# Patient Record
Sex: FEMALE | Race: OTHER | Employment: STUDENT | URBAN - NONMETROPOLITAN AREA
[De-identification: names, ages, dates, MRNs, and addresses within clinical notes are randomized per-mention and may not be internally consistent; named-entity substitution may affect disease eponyms.]

---
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
## 2022-01-20 ENCOUNTER — OFFICE VISIT (OUTPATIENT)
Dept: ORTHOPEDIC SURGERY | Age: 22
End: 2022-01-20
Payer: COMMERCIAL

## 2022-01-20 VITALS
HEART RATE: 75 BPM | DIASTOLIC BLOOD PRESSURE: 70 MMHG | TEMPERATURE: 97.2 F | OXYGEN SATURATION: 98 % | SYSTOLIC BLOOD PRESSURE: 109 MMHG

## 2022-01-20 DIAGNOSIS — Z86.16 HISTORY OF COVID-19: Primary | ICD-10-CM

## 2022-01-20 PROCEDURE — 99213 OFFICE O/P EST LOW 20 MIN: CPT | Performed by: STUDENT IN AN ORGANIZED HEALTH CARE EDUCATION/TRAINING PROGRAM

## 2022-01-20 NOTE — PROGRESS NOTES
CC:   Chief Complaint   Patient presents with    Follow-up     Athletic clearance after having COVID. No lingering symptoms. HPI  Yudelka Cedillo is a 25 y.o. female here to be evaluated after recent COVID infection. Onset of symptoms: 1/7/22  Tested positive: 1/7/22  Symptoms: headache, runny nose, mild cough, no fever  Current/lingering symptoms: No remaining symptoms, no cough, no fever    Has exercised, felt normal - just a little out of shape. No excessive SOB or chest pain. Review of Systems  As above. Vitals:    01/20/22 0913   BP: 109/70   Site: Right Upper Arm   Position: Sitting   Cuff Size: Small Adult   Pulse: 75   Temp: 97.2 °F (36.2 °C)   TempSrc: Temporal   SpO2: 98%     Physical Exam  Vitals reviewed. Constitutional:       General: She is not in acute distress. Appearance: She is not ill-appearing. HENT:      Head: Normocephalic and atraumatic. Nose: No congestion. Eyes:      Extraocular Movements: Extraocular movements intact. Conjunctiva/sclera: Conjunctivae normal.   Cardiovascular:      Rate and Rhythm: Normal rate and regular rhythm. Pulses: Normal pulses. Heart sounds: No murmur heard. No friction rub. No gallop. Pulmonary:      Effort: Pulmonary effort is normal. No respiratory distress. Breath sounds: No wheezing, rhonchi or rales. Musculoskeletal:      Cervical back: Normal range of motion. No rigidity or tenderness. Lymphadenopathy:      Cervical: No cervical adenopathy. Skin:     General: Skin is warm and dry. Neurological:      Mental Status: She is alert and oriented to person, place, and time. Psychiatric:         Mood and Affect: Mood normal.         Behavior: Behavior normal.         A/P  1. History of COVID-19  Recently had COVID-19, but had very mild symptoms without fever. She currently is feeling much better.   I am comfortable with her going back to athletic participation without any further testing at this point in time. Should she develop symptoms or difficulties she should come back to be evaluated. - EKG: not indicated  - Echo: not indicated  - May take OTC cough syrup with dextromethorphan as needed if lingering cough  - May return to athletic participation     Kellen Bond MD  Internal Medicine and Sports Medicine    Please note that this chart was at least partially generated using dragon dictation software. Although every effort was made to ensure the accuracy of this automated transcription, some errors in transcription may have occurred.

## 2023-08-27 ENCOUNTER — NURSE ONLY (OUTPATIENT)
Dept: CARDIOLOGY CLINIC | Age: 23
End: 2023-08-27
Payer: COMMERCIAL

## 2023-08-27 DIAGNOSIS — Z02.5 SPORTS PHYSICAL: Primary | ICD-10-CM

## 2023-08-27 PROCEDURE — 93000 ELECTROCARDIOGRAM COMPLETE: CPT | Performed by: INTERNAL MEDICINE
